# Patient Record
Sex: FEMALE | Race: WHITE | NOT HISPANIC OR LATINO
[De-identification: names, ages, dates, MRNs, and addresses within clinical notes are randomized per-mention and may not be internally consistent; named-entity substitution may affect disease eponyms.]

---

## 2018-01-04 PROBLEM — Z00.00 ENCOUNTER FOR PREVENTIVE HEALTH EXAMINATION: Status: ACTIVE | Noted: 2018-01-04

## 2018-01-10 ENCOUNTER — APPOINTMENT (OUTPATIENT)
Dept: NEUROLOGY | Facility: CLINIC | Age: 43
End: 2018-01-10
Payer: MEDICAID

## 2018-01-10 VITALS
DIASTOLIC BLOOD PRESSURE: 78 MMHG | SYSTOLIC BLOOD PRESSURE: 96 MMHG | HEIGHT: 66 IN | OXYGEN SATURATION: 98 % | TEMPERATURE: 98.3 F | HEART RATE: 61 BPM | WEIGHT: 172 LBS | BODY MASS INDEX: 27.64 KG/M2

## 2018-01-10 DIAGNOSIS — F17.200 NICOTINE DEPENDENCE, UNSPECIFIED, UNCOMPLICATED: ICD-10-CM

## 2018-01-10 DIAGNOSIS — R42 DIZZINESS AND GIDDINESS: ICD-10-CM

## 2018-01-10 DIAGNOSIS — F15.90 OTHER STIMULANT USE, UNSPECIFIED, UNCOMPLICATED: ICD-10-CM

## 2018-01-10 DIAGNOSIS — F41.9 ANXIETY DISORDER, UNSPECIFIED: ICD-10-CM

## 2018-01-10 DIAGNOSIS — Z82.3 FAMILY HISTORY OF STROKE: ICD-10-CM

## 2018-01-10 PROCEDURE — 99204 OFFICE O/P NEW MOD 45 MIN: CPT

## 2018-01-10 RX ORDER — MECLIZINE HYDROCHLORIDE 25 MG/1
25 TABLET ORAL 3 TIMES DAILY
Qty: 90 | Refills: 2 | Status: ACTIVE | COMMUNITY
Start: 2018-01-10 | End: 1900-01-01

## 2018-01-10 RX ORDER — CHROMIUM 200 MCG
TABLET ORAL
Refills: 0 | Status: ACTIVE | COMMUNITY

## 2018-01-27 ENCOUNTER — APPOINTMENT (OUTPATIENT)
Dept: MRI IMAGING | Facility: HOSPITAL | Age: 43
End: 2018-01-27
Payer: MEDICAID

## 2018-01-27 ENCOUNTER — OUTPATIENT (OUTPATIENT)
Dept: OUTPATIENT SERVICES | Facility: HOSPITAL | Age: 43
LOS: 1 days | End: 2018-01-27
Payer: MEDICAID

## 2018-01-27 DIAGNOSIS — R42 DIZZINESS AND GIDDINESS: ICD-10-CM

## 2018-01-27 PROCEDURE — 70551 MRI BRAIN STEM W/O DYE: CPT

## 2018-01-27 PROCEDURE — 70551 MRI BRAIN STEM W/O DYE: CPT | Mod: 26

## 2018-01-27 PROCEDURE — 70544 MR ANGIOGRAPHY HEAD W/O DYE: CPT | Mod: 26,59

## 2018-01-27 PROCEDURE — 70544 MR ANGIOGRAPHY HEAD W/O DYE: CPT

## 2018-05-02 ENCOUNTER — APPOINTMENT (OUTPATIENT)
Dept: NEUROLOGY | Facility: CLINIC | Age: 43
End: 2018-05-02

## 2018-09-27 ENCOUNTER — APPOINTMENT (OUTPATIENT)
Dept: NEUROLOGY | Facility: CLINIC | Age: 43
End: 2018-09-27

## 2021-07-09 ENCOUNTER — APPOINTMENT (OUTPATIENT)
Dept: UROLOGY | Facility: CLINIC | Age: 46
End: 2021-07-09
Payer: COMMERCIAL

## 2021-07-09 VITALS — HEART RATE: 78 BPM | DIASTOLIC BLOOD PRESSURE: 70 MMHG | SYSTOLIC BLOOD PRESSURE: 112 MMHG | TEMPERATURE: 98.1 F

## 2021-07-09 PROCEDURE — 99204 OFFICE O/P NEW MOD 45 MIN: CPT

## 2021-07-09 NOTE — LETTER BODY
[Dear  ___] : Dear  [unfilled], [Consult Letter:] : I had the pleasure of evaluating your patient, [unfilled]. [Please see my note below.] : Please see my note below. [Consult Closing:] : Thank you very much for allowing me to participate in the care of this patient.  If you have any questions, please do not hesitate to contact me. [Sincerely,] : Sincerely, [FreeTextEntry3] : Librado Quigley MD\par System Director [facility]\par Department of Urology\par [facility] \par   at [facility]\par  of Urology\par [facility] [employer] at [facility]\par  [DrFaustina  ___] : Dr. WILL

## 2021-07-09 NOTE — PHYSICAL EXAM
[General Appearance - Well Developed] : well developed [General Appearance - Well Nourished] : well nourished [Normal Appearance] : normal appearance [Well Groomed] : well groomed [General Appearance - In No Acute Distress] : no acute distress [Edema] : no peripheral edema [Respiration, Rhythm And Depth] : normal respiratory rhythm and effort [Exaggerated Use Of Accessory Muscles For Inspiration] : no accessory muscle use [Normal Station and Gait] : the gait and station were normal for the patient's age [] : no rash [Oriented To Time, Place, And Person] : oriented to person, place, and time [Affect] : the affect was normal [Mood] : the mood was normal [Not Anxious] : not anxious [Urethral Meatus] : normal urethra [Urinary Bladder Findings] : the bladder was normal on palpation [External Female Genitalia] : normal external genitalia [Vagina] : normal vaginal exam [FreeTextEntry1] : - CST, - [facility], - Apical and posterior prolapse noted on examination.

## 2021-07-09 NOTE — ASSESSMENT
[FreeTextEntry1] : \par Impression/plan: 45 year-old female referred by Dr. Moralez (GYN) presents to the office with c/o of urinary incontinence. \par \par 1. Urine c+s- r/o UTI.\par 2. F/u UDS- to assess bladder function. \par 3. Briefly discussed with patient different options that are available for RAMÓN, conservative versus surgical such as a bulking agent vs sling. Will discuss further once UDS interpretation is obtained.

## 2021-07-09 NOTE — HISTORY OF PRESENT ILLNESS
[FreeTextEntry1] : 45 year-old female referred by GYN Dr. Rafa Moralez, presents to the office with c/o leaking urine. Reports leaking starting 2 years ago with RAMÓN but has now become more of an unaware incontin. Patient goes through more than 6 pads a day. Has not taken any medications in the past. Patient denies any other irritative or obstructive urinary symptoms. Denies, fevers, chills, n/v/d, chest pain, or SOB. \par \par  (vaginal). Patient does report a heaviness felling in her vagina.\par \par

## 2021-07-12 ENCOUNTER — NON-APPOINTMENT (OUTPATIENT)
Age: 46
End: 2021-07-12

## 2021-07-12 LAB — BACTERIA UR CULT: NORMAL

## 2021-07-16 ENCOUNTER — APPOINTMENT (OUTPATIENT)
Dept: UROLOGY | Facility: CLINIC | Age: 46
End: 2021-07-16
Payer: COMMERCIAL

## 2021-07-16 VITALS
DIASTOLIC BLOOD PRESSURE: 82 MMHG | OXYGEN SATURATION: 98 % | HEART RATE: 75 BPM | SYSTOLIC BLOOD PRESSURE: 127 MMHG | TEMPERATURE: 98.4 F

## 2021-07-16 LAB
BILIRUB UR QL STRIP: NORMAL
CLARITY UR: CLEAR
COLLECTION METHOD: NORMAL
GLUCOSE UR-MCNC: NORMAL
HCG UR QL: 0.2 EU/DL
HGB UR QL STRIP.AUTO: NORMAL
KETONES UR-MCNC: NORMAL
LEUKOCYTE ESTERASE UR QL STRIP: NORMAL
NITRITE UR QL STRIP: NORMAL
PH UR STRIP: 6
PROT UR STRIP-MCNC: NORMAL
SP GR UR STRIP: 1.02

## 2021-07-16 PROCEDURE — 51797 INTRAABDOMINAL PRESSURE TEST: CPT

## 2021-07-16 PROCEDURE — 51741 ELECTRO-UROFLOWMETRY FIRST: CPT

## 2021-07-16 PROCEDURE — 51728 CYSTOMETROGRAM W/VP: CPT

## 2021-07-16 PROCEDURE — 51784 ANAL/URINARY MUSCLE STUDY: CPT

## 2021-07-16 PROCEDURE — 51798 US URINE CAPACITY MEASURE: CPT | Mod: 59

## 2021-07-23 ENCOUNTER — APPOINTMENT (OUTPATIENT)
Dept: UROLOGY | Facility: CLINIC | Age: 46
End: 2021-07-23
Payer: COMMERCIAL

## 2021-07-23 VITALS — HEART RATE: 72 BPM | OXYGEN SATURATION: 97 % | DIASTOLIC BLOOD PRESSURE: 80 MMHG | SYSTOLIC BLOOD PRESSURE: 139 MMHG

## 2021-07-23 VITALS — TEMPERATURE: 97.3 F

## 2021-07-23 DIAGNOSIS — R39.15 URGENCY OF URINATION: ICD-10-CM

## 2021-07-23 DIAGNOSIS — N39.46 MIXED INCONTINENCE: ICD-10-CM

## 2021-07-23 PROCEDURE — 99214 OFFICE O/P EST MOD 30 MIN: CPT

## 2021-07-23 RX ORDER — OXYBUTYNIN CHLORIDE 10 MG/1
10 TABLET, EXTENDED RELEASE ORAL DAILY
Qty: 30 | Refills: 0 | Status: ACTIVE | COMMUNITY
Start: 2021-07-23 | End: 1900-01-01

## 2021-07-23 NOTE — HISTORY OF PRESENT ILLNESS
[FreeTextEntry1] : 45 year-old female referred by GYN Dr. Rafa Moralez, presents to the office with c/o leaking urine. Reports leaking starting 2 years ago with RAMÓN but has now become more of an unaware incontin. Patient wears up to 6 pads a day. Patient changes pads when going to the bathroom to urinate for good hygiene. Pads are not soaked through; they are lightly stained. Has not taken any medications in the past. Patient denies any other irritative or obstructive urinary symptoms. Denies, fevers, chills, n/v/d, chest pain, or SOB. \par \par UDS: \par Filling/Storage Phase: First desire 217 mL, Normal desire 503 mL and Cystometric capacity 937 mL. Involuntary contractions were not present . Pt did not demonstrate stress urinary incontinence. Pt did not demonstrate urge urinary incontinence. Compliance: normal. EMG Activity: normal. \par Additional Comments: No leakage noted through the study. Upon standing and coughing patient felt leaks, with bladder catheter in place and removed. No leak visualized. \par \par Voiding Phase: Qmax 43.1 mL/s , Qavg 15.8 mL/s, voiding time 1:10 mm:sec, voided volume 937 mL and post void residual 0 mL. EMG activity was dyssynergic. \par Additional Comments: Bladder catheter removed prior to voiding phase to test for stress incontin. \par \par Urodynamic Interpretation : \par Normal bladder sensation. Increased bladder capacity. Patient experiencing no detrusor instability. The uroflow rate is normal. The uroflow pattern is normal. The patient has complete bladder emptying. The spincter activity is abnormal dyssynergic. \par Additional Procedure Related Findings/Comments: Bladder catheter removed prior to voiding phase to test for stress incontin. No leak noted throughout the study. Patient felt leak upon standing and coughing with and without the bladder catheters in place. \par \par

## 2021-07-23 NOTE — PHYSICAL EXAM
[General Appearance - Well Developed] : well developed [General Appearance - Well Nourished] : well nourished [Normal Appearance] : normal appearance [Well Groomed] : well groomed [General Appearance - In No Acute Distress] : no acute distress [Urethral Meatus] : normal urethra [Urinary Bladder Findings] : the bladder was normal on palpation [External Female Genitalia] : normal external genitalia [Vagina] : normal vaginal exam [FreeTextEntry1] : - CST, - [facility], - Prolapse noted at the last visit. [Edema] : no peripheral edema [] : no respiratory distress [Respiration, Rhythm And Depth] : normal respiratory rhythm and effort [Exaggerated Use Of Accessory Muscles For Inspiration] : no accessory muscle use [Oriented To Time, Place, And Person] : oriented to person, place, and time [Affect] : the affect was normal [Mood] : the mood was normal [Not Anxious] : not anxious [Normal Station and Gait] : the gait and station were normal for the patient's age

## 2021-07-23 NOTE — ASSESSMENT
[FreeTextEntry1] : \par Impression/plan: 45 year-old female with RAMÓN and unaware incontinence. \par \par 1. Start on Oxybutynin 10 mg daily for 2-3 weeks. If improvement in incontinence is seen and patient is happy with it, will consider 3rd line therapy for OAB. If no improvement noticed with anticholinergic, will proceed with surgical intervention for RAMÓN based on patient symptoms. \par 2. F/u in 1 month to discuss plan of care.

## 2021-08-27 ENCOUNTER — APPOINTMENT (OUTPATIENT)
Dept: UROLOGY | Facility: CLINIC | Age: 46
End: 2021-08-27